# Patient Record
Sex: FEMALE | Race: BLACK OR AFRICAN AMERICAN | NOT HISPANIC OR LATINO | Employment: FULL TIME | URBAN - METROPOLITAN AREA
[De-identification: names, ages, dates, MRNs, and addresses within clinical notes are randomized per-mention and may not be internally consistent; named-entity substitution may affect disease eponyms.]

---

## 2018-07-11 ENCOUNTER — HOSPITAL ENCOUNTER (EMERGENCY)
Facility: OTHER | Age: 48
Discharge: HOME | End: 2018-07-11
Attending: EMERGENCY MEDICINE
Payer: MEDICAID

## 2018-07-11 VITALS
WEIGHT: 163 LBS | TEMPERATURE: 99 F | SYSTOLIC BLOOD PRESSURE: 119 MMHG | HEIGHT: 64 IN | DIASTOLIC BLOOD PRESSURE: 73 MMHG | OXYGEN SATURATION: 97 % | BODY MASS INDEX: 27.83 KG/M2 | HEART RATE: 88 BPM | RESPIRATION RATE: 18 BRPM

## 2018-07-11 DIAGNOSIS — T14.90XA TRAUMA: ICD-10-CM

## 2018-07-11 DIAGNOSIS — S83.91XA SPRAIN OF RIGHT KNEE, UNSPECIFIED LIGAMENT, INITIAL ENCOUNTER: Primary | ICD-10-CM

## 2018-07-11 PROCEDURE — 99283 EMERGENCY DEPT VISIT LOW MDM: CPT | Mod: 25

## 2018-07-11 PROCEDURE — 96372 THER/PROPH/DIAG INJ SC/IM: CPT

## 2018-07-11 PROCEDURE — 63600175 PHARM REV CODE 636 W HCPCS: Performed by: EMERGENCY MEDICINE

## 2018-07-11 RX ORDER — KETOROLAC TROMETHAMINE 30 MG/ML
15 INJECTION, SOLUTION INTRAMUSCULAR; INTRAVENOUS
Status: COMPLETED | OUTPATIENT
Start: 2018-07-11 | End: 2018-07-11

## 2018-07-11 RX ADMIN — KETOROLAC TROMETHAMINE 15 MG: 30 INJECTION, SOLUTION INTRAMUSCULAR at 03:07

## 2018-07-11 NOTE — ED PROVIDER NOTES
Encounter Date: 7/11/2018    SCRIBE #1 NOTE: Lee ARAUJO, am scribing for, and in the presence of, Dr. Hancock.       History     Chief Complaint   Patient presents with    Fall     trip and fall resulting in R knee pain immediately after. denies hitting head or LOC     Seen by provider: 3:29 PM    Patient is a 47 y.o. female who presents to the ED with complaint of right knee pain s/p fall which occurred this morning. Patient states she fell onto her right knee while getting out of bed this morning. She denies head trauma or LOC. Right knee pain is worse with movement and weight bearing. She reports difficulty walking secondary to pain, but has been able to ambulate and bear weight since the injury. She notes a small abrasion to the right knee. She denies swelling. She reports no other injuries. She has no additional complaints.      The history is provided by the patient.     Review of patient's allergies indicates:  No Known Allergies  Past Medical History:   Diagnosis Date    Lupus      Past Surgical History:   Procedure Laterality Date    HYSTERECTOMY       No family history on file.  Social History   Substance Use Topics    Smoking status: Never Smoker    Smokeless tobacco: Not on file    Alcohol use No     Review of Systems   Constitutional: Negative for fever.   HENT: Negative for sore throat.    Respiratory: Negative for shortness of breath.    Cardiovascular: Negative for chest pain.   Gastrointestinal: Negative for nausea.   Genitourinary: Negative for dysuria.   Musculoskeletal: Positive for gait problem (secondary to knee pain). Negative for back pain.        Positive for right knee pain.   Skin: Positive for wound (abrasions to right knee). Negative for rash.   Neurological: Negative for syncope, weakness and headaches.   Hematological: Does not bruise/bleed easily.   Psychiatric/Behavioral: Negative for confusion.       Physical Exam     Initial Vitals [07/11/18 1445]   BP Pulse Resp Temp  SpO2   (!) 139/96 97 18 97.6 °F (36.4 °C) 98 %      MAP       --         Physical Exam    Nursing note and vitals reviewed.  Constitutional: She appears well-developed and well-nourished. No distress.   HENT:   Head: Normocephalic and atraumatic.   Eyes: Conjunctivae and EOM are normal. Pupils are equal, round, and reactive to light.   Neck: Normal range of motion. Neck supple.   Cardiovascular: Intact distal pulses.   Pulmonary/Chest: No respiratory distress.   Musculoskeletal: She exhibits tenderness. She exhibits no edema.   Right knee with bony tenderness and abrasions. No swelling. Painful range of motion of the knee. No bony tenderness of the tibia, fibula, or ankle.    Neurological: She is alert and oriented to person, place, and time.   Skin: Skin is warm and dry.   Psychiatric: She has a normal mood and affect. Her behavior is normal. Judgment and thought content normal.         ED Course   Procedures  Labs Reviewed - No data to display       Imaging Results    None          Medical Decision Making:   Clinical Tests:   Radiological Study: Ordered and Reviewed  ED Management:  Well-appearing patient presents after a trip and fall.  Complains of right knee and lower leg pain. Only signified physical examination are present bony tenderness. No swelling no abrasions no ecchymosis. X-rays are negative for fracture.  Likely soft tissue sprain.  Ketorolac here for pain. Counseled on rice therapy.  Provided with work note.    I did have an extensive talk regarding signs to return for and need for follow up. Patient expressed understanding and will monitor symptoms closely and follow-up as needed.    KIEL Hancock M.D.  07/11/2018  4:06 PM              Scribe Attestation:   Scribe #1: I performed the above scribed service and the documentation accurately describes the services I performed. I attest to the accuracy of the note.    Attending Attestation:           Physician Attestation for Scribe:  Physician  Attestation Statement for Scribe #1: I, Dr. Hancock, reviewed documentation, as scribed by Lee Newton in my presence, and it is both accurate and complete.                    Clinical Impression:     1. Sprain of right knee, unspecified ligament, initial encounter    2. Trauma                                Norman Hancock MD  07/11/18 3815

## 2021-12-15 ENCOUNTER — OFFICE VISIT (OUTPATIENT)
Dept: URGENT CARE | Facility: CLINIC | Age: 51
End: 2021-12-15
Payer: OTHER MISCELLANEOUS

## 2021-12-15 VITALS
TEMPERATURE: 99 F | SYSTOLIC BLOOD PRESSURE: 95 MMHG | BODY MASS INDEX: 27.83 KG/M2 | RESPIRATION RATE: 20 BRPM | WEIGHT: 163 LBS | HEART RATE: 80 BPM | HEIGHT: 64 IN | OXYGEN SATURATION: 96 % | DIASTOLIC BLOOD PRESSURE: 66 MMHG

## 2021-12-15 DIAGNOSIS — S56.912A STRAIN OF LEFT FOREARM, INITIAL ENCOUNTER: ICD-10-CM

## 2021-12-15 DIAGNOSIS — Z02.83 ENCOUNTER FOR DRUG SCREENING: Primary | ICD-10-CM

## 2021-12-15 DIAGNOSIS — M25.522 LEFT ELBOW PAIN: ICD-10-CM

## 2021-12-15 DIAGNOSIS — M79.632 LEFT FOREARM PAIN: ICD-10-CM
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
## 2021-12-15 PROCEDURE — 80305 OOH NON-DOT DRUG SCREEN: ICD-10-PCS | Mod: S$GLB,,, | Performed by: PHYSICIAN ASSISTANT

## 2021-12-15 PROCEDURE — 73080 XR ELBOW COMPLETE 3 VIEW LEFT: ICD-10-PCS | Mod: LT,S$GLB,, | Performed by: RADIOLOGY

## 2021-12-15 PROCEDURE — 80305 DRUG TEST PRSMV DIR OPT OBS: CPT | Mod: S$GLB,,, | Performed by: PHYSICIAN ASSISTANT

## 2021-12-15 PROCEDURE — 73090 XR FOREARM LEFT: ICD-10-PCS | Mod: LT,S$GLB,, | Performed by: RADIOLOGY

## 2021-12-15 PROCEDURE — 82075 POCT BREATH ALCOHOL TEST: ICD-10-PCS | Mod: S$GLB,,, | Performed by: PHYSICIAN ASSISTANT

## 2021-12-15 PROCEDURE — 73090 X-RAY EXAM OF FOREARM: CPT | Mod: LT,S$GLB,, | Performed by: RADIOLOGY

## 2021-12-15 PROCEDURE — 73080 X-RAY EXAM OF ELBOW: CPT | Mod: LT,S$GLB,, | Performed by: RADIOLOGY

## 2021-12-15 PROCEDURE — 82075 ASSAY OF BREATH ETHANOL: CPT | Mod: S$GLB,,, | Performed by: PHYSICIAN ASSISTANT

## 2021-12-15 PROCEDURE — 99203 OFFICE O/P NEW LOW 30 MIN: CPT | Mod: S$GLB,,, | Performed by: PHYSICIAN ASSISTANT

## 2021-12-15 PROCEDURE — 99203 PR OFFICE/OUTPT VISIT, NEW, LEVL III, 30-44 MIN: ICD-10-PCS | Mod: S$GLB,,, | Performed by: PHYSICIAN ASSISTANT

## 2021-12-15 RX ORDER — NAPROXEN 500 MG/1
500 TABLET ORAL 2 TIMES DAILY WITH MEALS
Qty: 20 TABLET | Refills: 0 | Status: SHIPPED | OUTPATIENT
Start: 2021-12-15 | End: 2021-12-25

## 2021-12-16 LAB — BREATH ALCOHOL: 0

## 2021-12-20 ENCOUNTER — OFFICE VISIT (OUTPATIENT)
Dept: URGENT CARE | Facility: CLINIC | Age: 51
End: 2021-12-20
Payer: OTHER MISCELLANEOUS

## 2021-12-20 VITALS
OXYGEN SATURATION: 99 % | WEIGHT: 163 LBS | DIASTOLIC BLOOD PRESSURE: 82 MMHG | HEART RATE: 82 BPM | HEIGHT: 64 IN | BODY MASS INDEX: 27.83 KG/M2 | SYSTOLIC BLOOD PRESSURE: 114 MMHG | TEMPERATURE: 98 F | RESPIRATION RATE: 16 BRPM

## 2021-12-20 DIAGNOSIS — M25.522 LEFT ELBOW PAIN: ICD-10-CM

## 2021-12-20 DIAGNOSIS — M79.632 LEFT FOREARM PAIN: Primary | ICD-10-CM

## 2021-12-20 PROCEDURE — 99203 OFFICE O/P NEW LOW 30 MIN: CPT | Mod: S$GLB,,, | Performed by: NURSE PRACTITIONER

## 2021-12-20 PROCEDURE — 99203 PR OFFICE/OUTPT VISIT, NEW, LEVL III, 30-44 MIN: ICD-10-PCS | Mod: S$GLB,,, | Performed by: NURSE PRACTITIONER

## 2022-01-06 ENCOUNTER — LAB VISIT (OUTPATIENT)
Dept: PRIMARY CARE CLINIC | Facility: CLINIC | Age: 52
End: 2022-01-06
Payer: MEDICAID

## 2022-01-06 DIAGNOSIS — Z20.822 CONTACT WITH AND (SUSPECTED) EXPOSURE TO COVID-19: ICD-10-CM

## 2022-01-06 LAB
CTP QC/QA: YES
SARS-COV-2 AG RESP QL IA.RAPID: NEGATIVE

## 2022-01-06 PROCEDURE — 87811 SARS-COV-2 COVID19 W/OPTIC: CPT

## 2023-10-26 ENCOUNTER — HOSPITAL ENCOUNTER (OUTPATIENT)
Facility: OTHER | Age: 53
Discharge: HOME | End: 2023-10-28
Attending: EMERGENCY MEDICINE | Admitting: INTERNAL MEDICINE
Payer: COMMERCIAL

## 2023-10-26 DIAGNOSIS — R50.9 FEVER: ICD-10-CM

## 2023-10-26 DIAGNOSIS — N17.9 AKI (ACUTE KIDNEY INJURY): Primary | ICD-10-CM

## 2023-10-26 DIAGNOSIS — R00.0 TACHYCARDIA: ICD-10-CM

## 2023-10-26 LAB
ALBUMIN SERPL BCP-MCNC: 3 G/DL (ref 3.5–5.2)
ALBUMIN SERPL BCP-MCNC: 3.4 G/DL (ref 3.5–5.2)
ALP SERPL-CCNC: 67 U/L (ref 55–135)
ALP SERPL-CCNC: 74 U/L (ref 55–135)
ALT SERPL W/O P-5'-P-CCNC: 11 U/L (ref 10–44)
ALT SERPL W/O P-5'-P-CCNC: 13 U/L (ref 10–44)
ANION GAP SERPL CALC-SCNC: 11 MMOL/L (ref 8–16)
ANION GAP SERPL CALC-SCNC: 13 MMOL/L (ref 8–16)
AST SERPL-CCNC: 18 U/L (ref 10–40)
AST SERPL-CCNC: 19 U/L (ref 10–40)
BASOPHILS # BLD AUTO: 0.02 K/UL (ref 0–0.2)
BASOPHILS NFR BLD: 0.2 % (ref 0–1.9)
BILIRUB SERPL-MCNC: 0.3 MG/DL (ref 0.1–1)
BILIRUB SERPL-MCNC: 0.4 MG/DL (ref 0.1–1)
BILIRUB UR QL STRIP: NEGATIVE
BUN SERPL-MCNC: 19 MG/DL (ref 6–20)
BUN SERPL-MCNC: 22 MG/DL (ref 6–20)
CALCIUM SERPL-MCNC: 7.9 MG/DL (ref 8.7–10.5)
CALCIUM SERPL-MCNC: 8.1 MG/DL (ref 8.7–10.5)
CHLORIDE SERPL-SCNC: 109 MMOL/L (ref 95–110)
CHLORIDE SERPL-SCNC: 112 MMOL/L (ref 95–110)
CK SERPL-CCNC: 131 U/L (ref 20–180)
CLARITY UR: CLEAR
CO2 SERPL-SCNC: 17 MMOL/L (ref 23–29)
CO2 SERPL-SCNC: 17 MMOL/L (ref 23–29)
COLOR UR: YELLOW
CREAT SERPL-MCNC: 1.6 MG/DL (ref 0.5–1.4)
CREAT SERPL-MCNC: 2.2 MG/DL (ref 0.5–1.4)
CTP QC/QA: YES
CTP QC/QA: YES
DIFFERENTIAL METHOD: ABNORMAL
EOSINOPHIL # BLD AUTO: 0.1 K/UL (ref 0–0.5)
EOSINOPHIL NFR BLD: 0.9 % (ref 0–8)
ERYTHROCYTE [DISTWIDTH] IN BLOOD BY AUTOMATED COUNT: 13 % (ref 11.5–14.5)
EST. GFR  (NO RACE VARIABLE): 26 ML/MIN/1.73 M^2
EST. GFR  (NO RACE VARIABLE): 38 ML/MIN/1.73 M^2
GLUCOSE SERPL-MCNC: 111 MG/DL (ref 70–110)
GLUCOSE SERPL-MCNC: 133 MG/DL (ref 70–110)
GLUCOSE UR QL STRIP: NEGATIVE
HCT VFR BLD AUTO: 38 % (ref 37–48.5)
HGB BLD-MCNC: 11.8 G/DL (ref 12–16)
HGB UR QL STRIP: ABNORMAL
IMM GRANULOCYTES # BLD AUTO: 0.02 K/UL (ref 0–0.04)
IMM GRANULOCYTES NFR BLD AUTO: 0.2 % (ref 0–0.5)
KETONES UR QL STRIP: NEGATIVE
LEUKOCYTE ESTERASE UR QL STRIP: NEGATIVE
LYMPHOCYTES # BLD AUTO: 1.4 K/UL (ref 1–4.8)
LYMPHOCYTES NFR BLD: 12.8 % (ref 18–48)
MCH RBC QN AUTO: 26.1 PG (ref 27–31)
MCHC RBC AUTO-ENTMCNC: 31.1 G/DL (ref 32–36)
MCV RBC AUTO: 84 FL (ref 82–98)
MONOCYTES # BLD AUTO: 0.2 K/UL (ref 0.3–1)
MONOCYTES NFR BLD: 1.7 % (ref 4–15)
NEUTROPHILS # BLD AUTO: 9 K/UL (ref 1.8–7.7)
NEUTROPHILS NFR BLD: 84.2 % (ref 38–73)
NITRITE UR QL STRIP: NEGATIVE
NRBC BLD-RTO: 0 /100 WBC
PH UR STRIP: 7 [PH] (ref 5–8)
PLATELET # BLD AUTO: 163 K/UL (ref 150–450)
PLATELET BLD QL SMEAR: ABNORMAL
PMV BLD AUTO: 9.3 FL (ref 9.2–12.9)
POC MOLECULAR INFLUENZA A AGN: NEGATIVE
POC MOLECULAR INFLUENZA B AGN: NEGATIVE
POTASSIUM SERPL-SCNC: 3.8 MMOL/L (ref 3.5–5.1)
POTASSIUM SERPL-SCNC: 4.1 MMOL/L (ref 3.5–5.1)
PROCALCITONIN SERPL IA-MCNC: 0.27 NG/ML
PROT SERPL-MCNC: 7.4 G/DL (ref 6–8.4)
PROT SERPL-MCNC: 8.3 G/DL (ref 6–8.4)
PROT UR QL STRIP: ABNORMAL
RBC # BLD AUTO: 4.52 M/UL (ref 4–5.4)
SARS-COV-2 RDRP RESP QL NAA+PROBE: NEGATIVE
SODIUM SERPL-SCNC: 139 MMOL/L (ref 136–145)
SODIUM SERPL-SCNC: 140 MMOL/L (ref 136–145)
SP GR UR STRIP: 1.01 (ref 1–1.03)
URN SPEC COLLECT METH UR: ABNORMAL
UROBILINOGEN UR STRIP-ACNC: NEGATIVE EU/DL
WBC # BLD AUTO: 10.69 K/UL (ref 3.9–12.7)

## 2023-10-26 PROCEDURE — 63600175 PHARM REV CODE 636 W HCPCS: Performed by: NURSE PRACTITIONER

## 2023-10-26 PROCEDURE — 63600175 PHARM REV CODE 636 W HCPCS: Performed by: PHYSICIAN ASSISTANT

## 2023-10-26 PROCEDURE — G0378 HOSPITAL OBSERVATION PER HR: HCPCS

## 2023-10-26 PROCEDURE — 93010 EKG 12-LEAD: ICD-10-PCS | Mod: ,,, | Performed by: INTERNAL MEDICINE

## 2023-10-26 PROCEDURE — 25000003 PHARM REV CODE 250: Performed by: NURSE PRACTITIONER

## 2023-10-26 PROCEDURE — 96361 HYDRATE IV INFUSION ADD-ON: CPT

## 2023-10-26 PROCEDURE — 99285 EMERGENCY DEPT VISIT HI MDM: CPT | Mod: 25

## 2023-10-26 PROCEDURE — 36415 COLL VENOUS BLD VENIPUNCTURE: CPT | Performed by: EMERGENCY MEDICINE

## 2023-10-26 PROCEDURE — 82550 ASSAY OF CK (CPK): CPT | Performed by: PHYSICIAN ASSISTANT

## 2023-10-26 PROCEDURE — 80053 COMPREHEN METABOLIC PANEL: CPT | Performed by: PHYSICIAN ASSISTANT

## 2023-10-26 PROCEDURE — 81003 URINALYSIS AUTO W/O SCOPE: CPT | Performed by: NURSE PRACTITIONER

## 2023-10-26 PROCEDURE — 96360 HYDRATION IV INFUSION INIT: CPT

## 2023-10-26 PROCEDURE — 80053 COMPREHEN METABOLIC PANEL: CPT | Mod: 91 | Performed by: NURSE PRACTITIONER

## 2023-10-26 PROCEDURE — 85025 COMPLETE CBC W/AUTO DIFF WBC: CPT | Performed by: PHYSICIAN ASSISTANT

## 2023-10-26 PROCEDURE — 93005 ELECTROCARDIOGRAM TRACING: CPT

## 2023-10-26 PROCEDURE — 93010 ELECTROCARDIOGRAM REPORT: CPT | Mod: ,,, | Performed by: INTERNAL MEDICINE

## 2023-10-26 PROCEDURE — 84145 PROCALCITONIN (PCT): CPT | Performed by: NURSE PRACTITIONER

## 2023-10-26 PROCEDURE — 87635 SARS-COV-2 COVID-19 AMP PRB: CPT | Performed by: NURSE PRACTITIONER

## 2023-10-26 PROCEDURE — 94761 N-INVAS EAR/PLS OXIMETRY MLT: CPT

## 2023-10-26 RX ORDER — TALC
6 POWDER (GRAM) TOPICAL NIGHTLY PRN
Status: DISCONTINUED | OUTPATIENT
Start: 2023-10-26 | End: 2023-10-28 | Stop reason: HOSPADM

## 2023-10-26 RX ORDER — SODIUM CHLORIDE 0.9 % (FLUSH) 0.9 %
10 SYRINGE (ML) INJECTION
Status: DISCONTINUED | OUTPATIENT
Start: 2023-10-26 | End: 2023-10-28 | Stop reason: HOSPADM

## 2023-10-26 RX ORDER — SODIUM CHLORIDE, SODIUM LACTATE, POTASSIUM CHLORIDE, CALCIUM CHLORIDE 600; 310; 30; 20 MG/100ML; MG/100ML; MG/100ML; MG/100ML
INJECTION, SOLUTION INTRAVENOUS CONTINUOUS
Status: DISCONTINUED | OUTPATIENT
Start: 2023-10-26 | End: 2023-10-28

## 2023-10-26 RX ORDER — MORPHINE SULFATE 4 MG/ML
4 INJECTION, SOLUTION INTRAMUSCULAR; INTRAVENOUS EVERY 6 HOURS PRN
Status: DISCONTINUED | OUTPATIENT
Start: 2023-10-26 | End: 2023-10-27

## 2023-10-26 RX ORDER — HYDROXYCHLOROQUINE SULFATE 200 MG/1
400 TABLET, FILM COATED ORAL DAILY
COMMUNITY
Start: 2023-09-24

## 2023-10-26 RX ORDER — ACETAMINOPHEN 500 MG
1000 TABLET ORAL
Status: COMPLETED | OUTPATIENT
Start: 2023-10-26 | End: 2023-10-26

## 2023-10-26 RX ORDER — HYDROXYCHLOROQUINE SULFATE 200 MG/1
200 TABLET, FILM COATED ORAL 2 TIMES DAILY
Status: DISCONTINUED | OUTPATIENT
Start: 2023-10-27 | End: 2023-10-28 | Stop reason: HOSPADM

## 2023-10-26 RX ORDER — AMOXICILLIN 250 MG
1 CAPSULE ORAL 2 TIMES DAILY PRN
Status: DISCONTINUED | OUTPATIENT
Start: 2023-10-27 | End: 2023-10-28 | Stop reason: HOSPADM

## 2023-10-26 RX ORDER — NALOXONE HCL 0.4 MG/ML
0.02 VIAL (ML) INJECTION
Status: DISCONTINUED | OUTPATIENT
Start: 2023-10-27 | End: 2023-10-28 | Stop reason: HOSPADM

## 2023-10-26 RX ORDER — SODIUM CHLORIDE 0.9 % (FLUSH) 0.9 %
10 SYRINGE (ML) INJECTION EVERY 8 HOURS
Status: DISCONTINUED | OUTPATIENT
Start: 2023-10-27 | End: 2023-10-28 | Stop reason: HOSPADM

## 2023-10-26 RX ORDER — HEPARIN SODIUM 5000 [USP'U]/ML
5000 INJECTION, SOLUTION INTRAVENOUS; SUBCUTANEOUS EVERY 8 HOURS
Status: DISCONTINUED | OUTPATIENT
Start: 2023-10-27 | End: 2023-10-28 | Stop reason: HOSPADM

## 2023-10-26 RX ORDER — ACETAMINOPHEN 325 MG/1
650 TABLET ORAL EVERY 6 HOURS PRN
Status: DISCONTINUED | OUTPATIENT
Start: 2023-10-27 | End: 2023-10-28 | Stop reason: HOSPADM

## 2023-10-26 RX ORDER — KETOROLAC TROMETHAMINE 30 MG/ML
15 INJECTION, SOLUTION INTRAMUSCULAR; INTRAVENOUS
Status: DISCONTINUED | OUTPATIENT
Start: 2023-10-26 | End: 2023-10-26

## 2023-10-26 RX ADMIN — SODIUM CHLORIDE, POTASSIUM CHLORIDE, SODIUM LACTATE AND CALCIUM CHLORIDE 1000 ML: 600; 310; 30; 20 INJECTION, SOLUTION INTRAVENOUS at 01:10

## 2023-10-26 RX ADMIN — ACETAMINOPHEN 1000 MG: 500 TABLET ORAL at 06:10

## 2023-10-26 RX ADMIN — SODIUM CHLORIDE, POTASSIUM CHLORIDE, SODIUM LACTATE AND CALCIUM CHLORIDE: 600; 310; 30; 20 INJECTION, SOLUTION INTRAVENOUS at 10:10

## 2023-10-26 RX ADMIN — SODIUM CHLORIDE, POTASSIUM CHLORIDE, SODIUM LACTATE AND CALCIUM CHLORIDE 1000 ML: 600; 310; 30; 20 INJECTION, SOLUTION INTRAVENOUS at 03:10

## 2023-10-26 NOTE — ED TRIAGE NOTES
Patient states she received an infusion for the first time at [facility] last Friday for osteoporosis. States since infusion, she has had generalized body aches and fever. Taking Tylenol and ibuprofen without relief. Presents in no distress.

## 2023-10-26 NOTE — ED NOTES
Patient reports SOB with ambulation.  Noted that patient is tachycardic s/t fever.  Notified provider of findings.

## 2023-10-26 NOTE — Clinical Note
"Lyubov Lowe" Eddie was seen and treated in our emergency department on 10/26/2023.  She may return to work on 11/06/2023.       If you have any questions or concerns, please don't hesitate to call.      Jillian CUENCA"

## 2023-10-26 NOTE — ED NOTES
Patient identifiers verified and correct for patient  C/C: generalized body aches  APPEARANCE: awake and alert in mild distress s/t generalized fatigue and body aches to bl hands and arms  SKIN: hot, dry and intact. No breakdown or bruising.   MUSCULOSKELETAL: Patient moving all extremities spontaneously, difficulty in movement of bl fingers, reports swelling and pain to hands. Ambulates independently PTA but now having difficutly d/t generalized pain to joint  RESPIRATORY: Denies shortness of breath.Respirations unlabored. Clear to auscultation  CARDIAC: Denies CP,  distal pulses; no peripheral edema  ABDOMEN: denies abdominal pain and n/v/d   : voids spontaneously, denies difficulty  Neurologic: AAO x 4; follows commands equal strength in all extremities; denies numbness/tingling. Denies dizziness

## 2023-10-26 NOTE — Clinical Note
"Lyubov Lowe" Eddie was seen and treated in our emergency department on 10/26/2023.  She may return to work on 10/30/2023.       If you have any questions or concerns, please don't hesitate to call.      Jillian CUENCA"

## 2023-10-26 NOTE — FIRST PROVIDER EVALUATION
Emergency Department TeleTriage Encounter Note      CHIEF COMPLAINT    Chief Complaint   Patient presents with    Generalized Body Aches     Body aches and pam leg and hand pain since fusion to right forearm Friday at [facility].        VITAL SIGNS   Initial Vitals [10/26/23 1134]   BP Pulse Resp Temp SpO2   (!) 94/59 110 20 98.2 °F (36.8 °C) 98 %      MAP       --            ALLERGIES    Review of patient's allergies indicates:  No Known Allergies    PROVIDER TRIAGE NOTE  This is a teletriage evaluation of a 52 y.o. female presenting to the ED complaining of body aches. Patient reports generalized body aches since having forearm fusion 6 days ago at [facility]. Denies fever or swelling.    Patient is alert and oriented. She speaks in complete sentences. She is sitting upright in the chair in no distress.     Initial orders will be placed and care will be transferred to an alternate provider when patient is roomed for a full evaluation. Any additional orders and the final disposition will be determined by that provider.         ORDERS  Labs Reviewed   CBC W/ AUTO DIFFERENTIAL   COMPREHENSIVE METABOLIC PANEL       ED Orders (720h ago, onward)      Start Ordered     Status Ordering Provider    10/26/23 1142 10/26/23 1141  CBC auto differential  STAT         Ordered GARY JUSTICE.    10/26/23 1142 10/26/23 1141  Comprehensive metabolic panel  STAT         Ordered GARY JUSTICE    10/26/23 1142 10/26/23 1141  Insert Saline lock IV  Once         Ordered GARY JUSTICE    10/26/23 1142 10/26/23 1141  EKG 12-lead  Once         Ordered GARY JUSTICE              Virtual Visit Note: The provider triage portion of this emergency department evaluation and documentation was performed via [employer], a HIPAA-compliant telemedicine application, in concert with a tele-presenter in the room. A face to face patient evaluation with one of my colleagues will occur once the patient is placed in an emergency department  room.      DISCLAIMER: This note was prepared with [employer] voice recognition transcription software. Garbled syntax, mangled pronouns, and other bizarre constructions may be attributed to that software system.

## 2023-10-26 NOTE — Clinical Note
"Lyubov"Ervin Morgan was seen and treated in our emergency department on 10/26/2023.  She may return to work on 11/06/2023.  Norman Lucio MD     If you have any questions or concerns, please don't hesitate to call.      Jillian CUENCA"

## 2023-10-26 NOTE — ED PROVIDER NOTES
"Source of History:  Patient     Chief complaint:  Generalized Body Aches (Body aches and pam leg and hand pain since fusion to right forearm Friday at [facility]. )      HPI:  Lyubov Morgan is a 52 y.o. female presenting to the emergency department reporting body aches, joint and bone pain with fatigue and malaise that began 3 days ago.  Patient received a infusion of Reclast on the 20th.  She denies any fever/chills cough, congestion or any abdominal pain.  No nausea vomiting or diarrhea    This is the extent to the patients complaints today here in the emergency department.    PMH:  As per HPI and below:  Past Medical History:   Diagnosis Date    Lupus      Past Surgical History:   Procedure Laterality Date    HYSTERECTOMY         Social History     Tobacco Use    Smoking status: Never    Smokeless tobacco: Never   Substance Use Topics    Alcohol use: No    Drug use: No       Review of patient's allergies indicates:  No Known Allergies    ROS: As per HPI and below:  General: No  fever.  No chills.  Fatigue/malaise, body aches  ENT: No sore throat. No ear pain  Chest: No shortness of breath. No cough.    Cardiovascular: No chest pain.   Abdomen:  No abdominal pain  Genito-Urinary: No abnormal urination.    Neurologic: No focal weakness.  No numbness.  No headache  MSK: no back pain.   Integument: No rashes or lesions.    Physical Exam:    /66 (BP Location: Right arm, Patient Position: Lying)   Pulse (!) 113   Temp 100 °F (37.8 °C) (Oral)   Resp 19   Ht 5' 4" (1.626 m)   Wt 78 kg (172 lb)   SpO2 96%   BMI 29.52 kg/m²   Vitals:    10/26/23 1308 10/26/23 1438 10/26/23 1556 10/26/23 1750   BP: (!) 98/58 116/66 112/73 109/67   Pulse: 98 105 (!) 120 (!) 122   Resp: 18 18 20 18   Temp:  97.8 °F (36.6 °C) 99.5 °F (37.5 °C) (!) 101.9 °F (38.8 °C)   TempSrc:  Oral Oral Oral   SpO2: 95% 98% 100% 99%   Weight:       Height:        10/26/23 2018   BP: 110/66   Pulse: (!) 113   Resp: 19   Temp: 100 °F (37.8 °C) "   TempSrc: Oral   SpO2: 96%   Weight:    Height:        Nursing note and vital signs reviewed.  Appearance: No acute distress. Well-appearing. Non-toxic.    Eyes: No conjunctival injection.  Extraocular muscles are intact.  ENT:  Mucous membranes are pink and moist.  Chest/ Respiratory: Clear to auscultation bilaterally.  Good air movement.  No wheezes.  No rhonchi. No rales. No accessory muscle use.  Cardiovascular: Regular rate and rhythm.  No murmurs. No gallops. No rubs.  Abdomen: Soft.  Nontender to palpation, bowel sounds are normal.  Back:  No CVA tenderness  Musculoskeletal: Good range of motion all joints.  No deformities.  Neck supple.  No meningismus.  Swelling noted to bilateral MCP joints  Skin: No rashes seen.  Good turgor.  No abrasions.  No ecchymoses.  Neuro: alert and oriented x3,  no focal neurological deficits.  Psych: Appropriate, conversant    Initial MDM:  52-year-old female who received a infusion of Reclast 6 days ago, presented with malaise, fatigue, body aches that began 3 days ago.  Per up-to-date, ELIDIA and acute phase reaction or common side effects.  Will obtain blood work to further evaluate.    Labs Reviewed   CBC W/ AUTO DIFFERENTIAL - Abnormal; Notable for the following components:       Result Value    Hemoglobin 11.8 (*)     MCH 26.1 (*)     MCHC 31.1 (*)     Gran # (ANC) 9.0 (*)     Mono # 0.2 (*)     Gran % 84.2 (*)     Lymph % 12.8 (*)     Mono % 1.7 (*)     All other components within normal limits   COMPREHENSIVE METABOLIC PANEL - Abnormal; Notable for the following components:    CO2 17 (*)     Glucose 111 (*)     BUN 22 (*)     Creatinine 2.2 (*)     Calcium 8.1 (*)     Albumin 3.4 (*)     eGFR 26 (*)     All other components within normal limits   COMPREHENSIVE METABOLIC PANEL - Abnormal; Notable for the following components:    Chloride 112 (*)     CO2 17 (*)     Glucose 133 (*)     Creatinine 1.6 (*)     Calcium 7.9 (*)     Albumin 3.0 (*)     eGFR 38 (*)     All other  components within normal limits   URINALYSIS, REFLEX TO URINE CULTURE - Abnormal; Notable for the following components:    Protein, UA Trace (*)     Occult Blood UA Trace (*)     All other components within normal limits    Narrative:     Specimen Source->Urine   CK   PROCALCITONIN   POCT INFLUENZA A/B MOLECULAR   SARS-COV-2 RDRP GENE       X-Ray Chest 1 View   Final Result      1. Pulmonary findings may reflect edema noting shallow inspiratory effort.  No large focal consolidation.         Electronically signed by: Matt Morris MD   Date:    10/26/2023   Time:    18:31            Initial Impression/ Differential Dx:  Differential diagnosis includes but not limited to: GERD, intestinal spasm, gastroenteritis, gastritis, ulcer, cholecystitis, cholelithiasis, gallstones, pancreatitis, ileus, small bowel obstruction, appendicitis, diverticulitis, colitis, constipation, intestinal gas pain      MDM:    52 y.o. female with osteoporosis, lupus who received a infusion of Reclast 6 days ago presented for malaise, bone pain and body aches x3 days.  Labs revealed an ELIDIA with a creatinine of 2.2, along with a CO2 of 17 without a gap.  ELIDIA as a side effect of Reclast infusions.  Spoke with Nephrology who recommended IV fluids.  Patient was moved up to the EDOU for fluids and a CMP recheck.  While in the ED you she spiked a fever of 101.9, chest x-ray was obtained along with a UA which were both negative for any infectious processes.  Unknown fever source.  Patient's repeat CMP had an improved creatinine however not back to normal.  She will be admitted to Hospital Medicine for continued treatment of her ELIDIA and unknown fever source.    ED Course as of 10/26/23 2135   Thu Oct 26, 2023   1229 WBC: 10.69 [AS]   1229 Hemoglobin(!): 11.8 [AS]   1229 Hematocrit: 38.0 [AS]   1240 CO2(!): 17 [AS]   1240 BUN(!): 22 [AS]   1240 Creatinine(!): 2.2 [AS]   1404 Spoke with Momo Kolb with Nephro about patients labs.  He recommended  2-3 L of LR and recheck BMP to reassess creat.  Can be discharged if creat improves.   [AS]   2023 Creatinine(!): 1.6 [AS]   2023 BUN: 19 [AS]   2023 Calcium(!): 7.9 [AS]   2023 CO2(!): 17 [AS]      ED Course User Index  [AS] Polly Dorsey FNP       Diagnostic Impression:    1. ELIDIA (acute kidney injury)    2. Tachycardia    3. Fever         ED Disposition Condition    Observation Stable                    Polly Dorsey FNP  10/26/23 2135

## 2023-10-27 PROBLEM — M79.10 MYALGIA: Status: ACTIVE | Noted: 2023-10-27

## 2023-10-27 PROBLEM — M81.0 OSTEOPOROSIS: Status: ACTIVE | Noted: 2023-10-27

## 2023-10-27 PROBLEM — R50.9 FEVER: Status: ACTIVE | Noted: 2023-10-27

## 2023-10-27 LAB
ADENOVIRUS: NOT DETECTED
ANION GAP SERPL CALC-SCNC: 7 MMOL/L (ref 8–16)
BASOPHILS # BLD AUTO: 0.01 K/UL (ref 0–0.2)
BASOPHILS NFR BLD: 0.1 % (ref 0–1.9)
BORDETELLA PARAPERTUSSIS (IS1001): NOT DETECTED
BORDETELLA PERTUSSIS (PTXP): NOT DETECTED
BUN SERPL-MCNC: 16 MG/DL (ref 6–20)
C3 SERPL-MCNC: 104 MG/DL (ref 50–180)
C4 SERPL-MCNC: 23 MG/DL (ref 11–44)
CALCIUM SERPL-MCNC: 7.9 MG/DL (ref 8.7–10.5)
CHLAMYDIA PNEUMONIAE: NOT DETECTED
CHLORIDE SERPL-SCNC: 114 MMOL/L (ref 95–110)
CO2 SERPL-SCNC: 19 MMOL/L (ref 23–29)
CORONAVIRUS 229E, COMMON COLD VIRUS: NOT DETECTED
CORONAVIRUS HKU1, COMMON COLD VIRUS: NOT DETECTED
CORONAVIRUS NL63, COMMON COLD VIRUS: NOT DETECTED
CORONAVIRUS OC43, COMMON COLD VIRUS: NOT DETECTED
CREAT SERPL-MCNC: 1.1 MG/DL (ref 0.5–1.4)
DIFFERENTIAL METHOD: ABNORMAL
EOSINOPHIL # BLD AUTO: 0.1 K/UL (ref 0–0.5)
EOSINOPHIL NFR BLD: 1.7 % (ref 0–8)
ERYTHROCYTE [DISTWIDTH] IN BLOOD BY AUTOMATED COUNT: 13.3 % (ref 11.5–14.5)
EST. GFR  (NO RACE VARIABLE): >60 ML/MIN/1.73 M^2
FLUBV RNA NPH QL NAA+NON-PROBE: NOT DETECTED
GLUCOSE SERPL-MCNC: 121 MG/DL (ref 70–110)
HCT VFR BLD AUTO: 32 % (ref 37–48.5)
HGB BLD-MCNC: 10.4 G/DL (ref 12–16)
HPIV1 RNA NPH QL NAA+NON-PROBE: NOT DETECTED
HPIV2 RNA NPH QL NAA+NON-PROBE: NOT DETECTED
HPIV3 RNA NPH QL NAA+NON-PROBE: NOT DETECTED
HPIV4 RNA NPH QL NAA+NON-PROBE: NOT DETECTED
HUMAN METAPNEUMOVIRUS: NOT DETECTED
IMM GRANULOCYTES # BLD AUTO: 0.02 K/UL (ref 0–0.04)
IMM GRANULOCYTES NFR BLD AUTO: 0.2 % (ref 0–0.5)
INFLUENZA A (SUBTYPES H1,H1-2009,H3): NOT DETECTED
LYMPHOCYTES # BLD AUTO: 1 K/UL (ref 1–4.8)
LYMPHOCYTES NFR BLD: 12.2 % (ref 18–48)
MAGNESIUM SERPL-MCNC: 1.7 MG/DL (ref 1.6–2.6)
MCH RBC QN AUTO: 26.4 PG (ref 27–31)
MCHC RBC AUTO-ENTMCNC: 32.5 G/DL (ref 32–36)
MCV RBC AUTO: 81 FL (ref 82–98)
MONOCYTES # BLD AUTO: 0.2 K/UL (ref 0.3–1)
MONOCYTES NFR BLD: 1.8 % (ref 4–15)
MYCOPLASMA PNEUMONIAE: NOT DETECTED
NEUTROPHILS # BLD AUTO: 7 K/UL (ref 1.8–7.7)
NEUTROPHILS NFR BLD: 84 % (ref 38–73)
NRBC BLD-RTO: 0 /100 WBC
PLATELET # BLD AUTO: 160 K/UL (ref 150–450)
PMV BLD AUTO: 9.2 FL (ref 9.2–12.9)
POTASSIUM SERPL-SCNC: 3.6 MMOL/L (ref 3.5–5.1)
RBC # BLD AUTO: 3.94 M/UL (ref 4–5.4)
RESPIRATORY INFECTION PANEL SOURCE: NORMAL
RSV RNA NPH QL NAA+NON-PROBE: NOT DETECTED
RV+EV RNA NPH QL NAA+NON-PROBE: NOT DETECTED
SARS-COV-2 RNA RESP QL NAA+PROBE: NOT DETECTED
SODIUM SERPL-SCNC: 140 MMOL/L (ref 136–145)
WBC # BLD AUTO: 8.34 K/UL (ref 3.9–12.7)

## 2023-10-27 PROCEDURE — 87633 RESP VIRUS 12-25 TARGETS: CPT | Performed by: INTERNAL MEDICINE

## 2023-10-27 PROCEDURE — 63600175 PHARM REV CODE 636 W HCPCS: Performed by: PHYSICIAN ASSISTANT

## 2023-10-27 PROCEDURE — 36415 COLL VENOUS BLD VENIPUNCTURE: CPT | Performed by: PHYSICIAN ASSISTANT

## 2023-10-27 PROCEDURE — G0378 HOSPITAL OBSERVATION PER HR: HCPCS

## 2023-10-27 PROCEDURE — 80048 BASIC METABOLIC PNL TOTAL CA: CPT | Performed by: PHYSICIAN ASSISTANT

## 2023-10-27 PROCEDURE — 86160 COMPLEMENT ANTIGEN: CPT | Performed by: PHYSICIAN ASSISTANT

## 2023-10-27 PROCEDURE — 87798 DETECT AGENT NOS DNA AMP: CPT | Mod: 59 | Performed by: INTERNAL MEDICINE

## 2023-10-27 PROCEDURE — 25000003 PHARM REV CODE 250: Performed by: PHYSICIAN ASSISTANT

## 2023-10-27 PROCEDURE — 25000003 PHARM REV CODE 250: Performed by: INTERNAL MEDICINE

## 2023-10-27 PROCEDURE — 85025 COMPLETE CBC W/AUTO DIFF WBC: CPT | Performed by: PHYSICIAN ASSISTANT

## 2023-10-27 PROCEDURE — 96372 THER/PROPH/DIAG INJ SC/IM: CPT | Performed by: PHYSICIAN ASSISTANT

## 2023-10-27 PROCEDURE — A4216 STERILE WATER/SALINE, 10 ML: HCPCS | Performed by: PHYSICIAN ASSISTANT

## 2023-10-27 PROCEDURE — 83735 ASSAY OF MAGNESIUM: CPT | Performed by: PHYSICIAN ASSISTANT

## 2023-10-27 PROCEDURE — 86160 COMPLEMENT ANTIGEN: CPT | Mod: 59 | Performed by: PHYSICIAN ASSISTANT

## 2023-10-27 PROCEDURE — 96361 HYDRATE IV INFUSION ADD-ON: CPT

## 2023-10-27 RX ORDER — POTASSIUM CHLORIDE 20 MEQ/1
40 TABLET, EXTENDED RELEASE ORAL ONCE
Status: COMPLETED | OUTPATIENT
Start: 2023-10-27 | End: 2023-10-27

## 2023-10-27 RX ORDER — HYDROCODONE BITARTRATE AND ACETAMINOPHEN 5; 325 MG/1; MG/1
1 TABLET ORAL EVERY 6 HOURS PRN
Status: DISCONTINUED | OUTPATIENT
Start: 2023-10-27 | End: 2023-10-28 | Stop reason: HOSPADM

## 2023-10-27 RX ORDER — MORPHINE SULFATE 4 MG/ML
4 INJECTION, SOLUTION INTRAMUSCULAR; INTRAVENOUS EVERY 6 HOURS PRN
Status: DISCONTINUED | OUTPATIENT
Start: 2023-10-27 | End: 2023-10-28 | Stop reason: HOSPADM

## 2023-10-27 RX ORDER — ERGOCALCIFEROL 1.25 MG/1
50000 CAPSULE ORAL
COMMUNITY
Start: 2023-09-23

## 2023-10-27 RX ORDER — LANOLIN ALCOHOL/MO/W.PET/CERES
400 CREAM (GRAM) TOPICAL ONCE
Status: COMPLETED | OUTPATIENT
Start: 2023-10-27 | End: 2023-10-27

## 2023-10-27 RX ADMIN — HEPARIN SODIUM 5000 UNITS: 5000 INJECTION INTRAVENOUS; SUBCUTANEOUS at 02:10

## 2023-10-27 RX ADMIN — Medication 10 ML: at 06:10

## 2023-10-27 RX ADMIN — SODIUM CHLORIDE, POTASSIUM CHLORIDE, SODIUM LACTATE AND CALCIUM CHLORIDE: 600; 310; 30; 20 INJECTION, SOLUTION INTRAVENOUS at 05:10

## 2023-10-27 RX ADMIN — HYDROXYCHLOROQUINE SULFATE 200 MG: 200 TABLET, FILM COATED ORAL at 09:10

## 2023-10-27 RX ADMIN — ACETAMINOPHEN 650 MG: 325 TABLET, FILM COATED ORAL at 02:10

## 2023-10-27 RX ADMIN — HYDROCODONE BITARTRATE AND ACETAMINOPHEN 1 TABLET: 5; 325 TABLET ORAL at 11:10

## 2023-10-27 RX ADMIN — HYDROXYCHLOROQUINE SULFATE 200 MG: 200 TABLET, FILM COATED ORAL at 08:10

## 2023-10-27 RX ADMIN — Medication 10 ML: at 09:10

## 2023-10-27 RX ADMIN — HEPARIN SODIUM 5000 UNITS: 5000 INJECTION INTRAVENOUS; SUBCUTANEOUS at 05:10

## 2023-10-27 RX ADMIN — HYDROCODONE BITARTRATE AND ACETAMINOPHEN 1 TABLET: 5; 325 TABLET ORAL at 05:10

## 2023-10-27 RX ADMIN — SODIUM CHLORIDE, POTASSIUM CHLORIDE, SODIUM LACTATE AND CALCIUM CHLORIDE: 600; 310; 30; 20 INJECTION, SOLUTION INTRAVENOUS at 02:10

## 2023-10-27 RX ADMIN — HEPARIN SODIUM 5000 UNITS: 5000 INJECTION INTRAVENOUS; SUBCUTANEOUS at 09:10

## 2023-10-27 RX ADMIN — Medication 10 ML: at 02:10

## 2023-10-27 RX ADMIN — Medication 400 MG: at 08:10

## 2023-10-27 RX ADMIN — SODIUM CHLORIDE, POTASSIUM CHLORIDE, SODIUM LACTATE AND CALCIUM CHLORIDE: 600; 310; 30; 20 INJECTION, SOLUTION INTRAVENOUS at 10:10

## 2023-10-27 RX ADMIN — POTASSIUM CHLORIDE 40 MEQ: 1500 TABLET, EXTENDED RELEASE ORAL at 08:10

## 2023-10-27 NOTE — H&P
[facility] Emergency Dept (Observation)  [facility] Medicine  History & Physical    Patient Name: Lyubov Morgan  MRN: [id number]  Patient Class: OP- Observation  Admission Date: 10/26/2023  Attending Physician: Norman Lucio MD   Primary Care Provider: [facility] -          Patient information was obtained from patient, past medical records and ER records.     Subjective:     Principal Problem:ELIDIA (acute kidney injury)    Chief Complaint:   Chief Complaint   Patient presents with    Generalized Body Aches     Body aches and pam leg and hand pain since fusion to right forearm Friday at [facility].         HPI: Ms. Lyubov Morgan is a 52 y.o. female, with PMH of SLE, TENNILLE, who presented to [facility] ED on 10/26/23 due to generalized body aches as well as bilateral hand/leg pain for the past 3 days. She notes associated fatigue, malaise. She has been treating her symptoms with tylenol and ibuprofen. She states the symptoms started after her Reclast infusion for osteoporosis last Friday 10/20 at [facility]. She denied fever/chills, cough, congestion, abdominal pain, nausea, vomiting, diarrhea. While in the ED she was noted to have a fever of 101.9F. She had negative testing for Covid, Flu, and UTI. A CXR showed pulmonary edema vs. Shallow inspiration without consolidation. A procalcitonin was minimally about ULN at 0.27. Metabolic panel showed elevated creatinine of 2.2 initially, that improved to 1.6 after administration of 2L LR in the ED. She was placed on observation.       Past Medical History:   Diagnosis Date    Lupus        Past Surgical History:   Procedure Laterality Date    HYSTERECTOMY         Review of patient's allergies indicates:  No Known Allergies    No current facility-administered medications on file prior to encounter.     Current Outpatient Medications on File Prior to Encounter   Medication Sig    cyanocobalamin, vitamin B-12, 50 mcg tablet Take 50 mcg by mouth once daily.    ferrous sulfate  325 (65 FE) MG EC tablet Take 325 mg by mouth 3 (three) times daily with meals.    hydroxychloroquine (PLAQUENIL) 200 mg tablet Take 400 mg by mouth once daily.    vitamin D 1000 units Tab Take 185 mg by mouth once daily.     Family History    None       Tobacco Use    Smoking status: Never    Smokeless tobacco: Never   Substance and Sexual Activity    Alcohol use: No    Drug use: No    Sexual activity: Not on file     Review of Systems   Constitutional:  Positive for chills, diaphoresis, fatigue and fever. Negative for activity change and appetite change.   HENT:  Negative for congestion, ear pain, postnasal drip, rhinorrhea, sinus pressure, sore throat and trouble swallowing.    Respiratory:  Negative for cough, shortness of breath and wheezing.    Cardiovascular:  Negative for chest pain and palpitations.   Gastrointestinal:  Negative for abdominal distention, abdominal pain, constipation, diarrhea, nausea and vomiting.   Genitourinary:  Negative for dysuria, flank pain, frequency, hematuria, urgency, vaginal discharge and vaginal pain.   Musculoskeletal:  Positive for arthralgias, joint swelling and myalgias. Negative for back pain, gait problem, neck pain and neck stiffness.   Skin:  Negative for color change, pallor and rash.   Neurological:  Negative for dizziness, syncope, weakness, light-headedness, numbness and headaches.   Psychiatric/Behavioral:  Negative for agitation, behavioral problems, confusion and decreased concentration.      Objective:     Vital Signs (Most Recent):  Temp: 98.4 °F (36.9 °C) (10/27/23 0014)  Pulse: 105 (10/27/23 0200)  Resp: 19 (10/27/23 0014)  BP: (!) 96/57 (10/27/23 0014)  SpO2: 99 % (10/27/23 0014) Vital Signs (24h Range):  Temp:  [97.8 °F (36.6 °C)-101.9 °F (38.8 °C)] 98.4 °F (36.9 °C)  Pulse:  [] 105  Resp:  [18-20] 19  SpO2:  [95 %-100 %] 99 %  BP: ()/(57-73) 96/57     Weight: 78 kg (172 lb)  Body mass index is 29.52 kg/m².     Physical Exam  Vitals and  nursing note reviewed.   Constitutional:       General: She is not in acute distress.     Appearance: Normal appearance. She is well-developed and normal weight. She is not ill-appearing, toxic-appearing or diaphoretic.   HENT:      Head: Normocephalic and atraumatic.   Eyes:      General: No scleral icterus.        Right eye: No discharge.         Left eye: No discharge.      Conjunctiva/sclera: Conjunctivae normal.   Neck:      Trachea: No tracheal deviation.   Cardiovascular:      Rate and Rhythm: Normal rate and regular rhythm.      Heart sounds: Normal heart sounds. No murmur heard.     No gallop.   Pulmonary:      Effort: Pulmonary effort is normal. No respiratory distress.      Breath sounds: Normal breath sounds. No stridor. No wheezing or rales.   Abdominal:      General: Bowel sounds are normal. There is no distension.      Palpations: Abdomen is soft. There is no mass.      Tenderness: There is no abdominal tenderness. There is no guarding.   Musculoskeletal:         General: Swelling (bilateral carpometacarpal joints with most swelling on dorsal side of hand) and tenderness (b/l hands) present. No deformity or signs of injury. Normal range of motion.      Cervical back: Normal range of motion and neck supple.      Right lower leg: No edema.      Left lower leg: No edema.   Skin:     General: Skin is warm and dry.      Coloration: Skin is not pale.      Findings: No bruising, erythema or rash.   Neurological:      General: No focal deficit present.      Mental Status: She is alert and oriented to person, place, and time.      Cranial Nerves: No cranial nerve deficit.      Motor: No abnormal muscle tone.   Psychiatric:         Mood and Affect: Mood normal.         Behavior: Behavior normal.         Thought Content: Thought content normal.         Judgment: Judgment normal.                Significant Labs: All pertinent labs within the past 24 hours have been reviewed.  BMP:   Recent Labs   Lab  "10/26/23  1938   *      K 3.8   *   CO2 17*   BUN 19   CREATININE 1.6*   CALCIUM 7.9*     CBC:   Recent Labs   Lab 10/26/23  1157   WBC 10.69   HGB 11.8*   HCT 38.0        CMP:   Recent Labs   Lab 10/26/23  1157 10/26/23  1938    140   K 4.1 3.8    112*   CO2 17* 17*   * 133*   BUN 22* 19   CREATININE 2.2* 1.6*   CALCIUM 8.1* 7.9*   PROT 8.3 7.4   ALBUMIN 3.4* 3.0*   BILITOT 0.4 0.3   ALKPHOS 74 67   AST 19 18   ALT 13 11   ANIONGAP 13 11     Urine Culture: No results for input(s): "LABURIN" in the last 48 hours.  Urine Studies:   Recent Labs   Lab 10/26/23  2021   COLORU Yellow   APPEARANCEUA Clear   PHUR 7.0   SPECGRAV 1.010   PROTEINUA Trace*   GLUCUA Negative   KETONESU Negative   BILIRUBINUA Negative   OCCULTUA Trace*   NITRITE Negative   UROBILINOGEN Negative   LEUKOCYTESUR Negative       Significant Imaging: I have reviewed all pertinent imaging results/findings within the past 24 hours.  Imaging Results              X-Ray Chest 1 View (Final result)  Result time 10/26/23 18:31:53      Final result by Matt Morris MD (10/26/23 18:31:53)                   Impression:      1. Pulmonary findings may reflect edema noting shallow inspiratory effort.  No large focal consolidation.      Electronically signed by: Matt Morris MD  Date:    10/26/2023  Time:    18:31               Narrative:    EXAMINATION:  XR CHEST 1 VIEW    CLINICAL HISTORY:  Fever, unspecified    TECHNIQUE:  Single frontal view of the chest was performed.    COMPARISON:  None    FINDINGS:  The cardiomediastinal silhouette is not enlarged, magnified by technique..  There is no pleural effusion.  The trachea is midline.  The lungs are symmetrically expanded bilaterally coarse interstitial attenuation.  There is bilateral basilar subsegmental atelectasis or scarring..  No large focal consolidation seen.  There is no pneumothorax.  The osseous structures are remarkable for degenerative change..   "                                     Assessment/Plan:     * ELIDIA (acute kidney injury)  - Ms. Lyubov Morgan presents with generalized body aches, and fever 6 days after Reclast infusion   - she has h/o Lupus Nephritis, and recently restarted Plaquenil   - baseline Cr appears to be about 0.9  - today Cr was 2.2 on initially labs and 1.6 after 2L LR in ED   - continue LR infusion overnight   - case discussed between ED NP and [facility], consult order placed   - avoid nephrotoxins, renally dose meds     Fever  - no reported fever at home   - fever in ED of 101.9 F  - infectious workup negative   - suspect 2/2 reaction to Reclast infusion    Myalgia  - suspect 2/2 acute phase reaction to Reclast infusion   - heat/ice as needed       Iron deficiency anemia  - H&H at baseline   - monitor with IVF administration     Osteoporosis  - s/p Reclast infusion on 10/20     Systemic lupus erythematosus  - reports she recently restarted Plaquenil after PFTs showed early ILD       VTE Risk Mitigation (From admission, onward)         Ordered     heparin (porcine) injection 5,000 Units  Every 8 hours         10/26/23 2307     IP VTE HIGH RISK PATIENT  Once         10/26/23 2307     Place KOREY hose  Until discontinued         10/26/23 2106                     JOSÉ MANUEL [facility]  Department of Hospital Medicine  [facility] - Emergency Dept (Observation)

## 2023-10-27 NOTE — SUBJECTIVE & OBJECTIVE
Past Medical History:   Diagnosis Date    Lupus        Past Surgical History:   Procedure Laterality Date    HYSTERECTOMY         Review of patient's allergies indicates:  No Known Allergies    No current facility-administered medications on file prior to encounter.     Current Outpatient Medications on File Prior to Encounter   Medication Sig    cyanocobalamin, vitamin B-12, 50 mcg tablet Take 50 mcg by mouth once daily.    ferrous sulfate 325 (65 FE) MG EC tablet Take 325 mg by mouth 3 (three) times daily with meals.    hydroxychloroquine (PLAQUENIL) 200 mg tablet Take 400 mg by mouth once daily.    vitamin D 1000 units Tab Take 185 mg by mouth once daily.     Family History    None       Tobacco Use    Smoking status: Never    Smokeless tobacco: Never   Substance and Sexual Activity    Alcohol use: No    Drug use: No    Sexual activity: Not on file     Review of Systems   Constitutional:  Positive for chills, diaphoresis, fatigue and fever. Negative for activity change and appetite change.   HENT:  Negative for congestion, ear pain, postnasal drip, rhinorrhea, sinus pressure, sore throat and trouble swallowing.    Respiratory:  Negative for cough, shortness of breath and wheezing.    Cardiovascular:  Negative for chest pain and palpitations.   Gastrointestinal:  Negative for abdominal distention, abdominal pain, constipation, diarrhea, nausea and vomiting.   Genitourinary:  Negative for dysuria, flank pain, frequency, hematuria, urgency, vaginal discharge and vaginal pain.   Musculoskeletal:  Positive for arthralgias, joint swelling and myalgias. Negative for back pain, gait problem, neck pain and neck stiffness.   Skin:  Negative for color change, pallor and rash.   Neurological:  Negative for dizziness, syncope, weakness, light-headedness, numbness and headaches.   Psychiatric/Behavioral:  Negative for agitation, behavioral problems, confusion and decreased concentration.      Objective:     Vital Signs (Most  Recent):  Temp: 98.4 °F (36.9 °C) (10/27/23 0014)  Pulse: 105 (10/27/23 0200)  Resp: 19 (10/27/23 0014)  BP: (!) 96/57 (10/27/23 0014)  SpO2: 99 % (10/27/23 0014) Vital Signs (24h Range):  Temp:  [97.8 °F (36.6 °C)-101.9 °F (38.8 °C)] 98.4 °F (36.9 °C)  Pulse:  [] 105  Resp:  [18-20] 19  SpO2:  [95 %-100 %] 99 %  BP: ()/(57-73) 96/57     Weight: 78 kg (172 lb)  Body mass index is 29.52 kg/m².     Physical Exam  Vitals and nursing note reviewed.   Constitutional:       General: She is not in acute distress.     Appearance: Normal appearance. She is well-developed and normal weight. She is not ill-appearing, toxic-appearing or diaphoretic.   HENT:      Head: Normocephalic and atraumatic.   Eyes:      General: No scleral icterus.        Right eye: No discharge.         Left eye: No discharge.      Conjunctiva/sclera: Conjunctivae normal.   Neck:      Trachea: No tracheal deviation.   Cardiovascular:      Rate and Rhythm: Normal rate and regular rhythm.      Heart sounds: Normal heart sounds. No murmur heard.     No gallop.   Pulmonary:      Effort: Pulmonary effort is normal. No respiratory distress.      Breath sounds: Normal breath sounds. No stridor. No wheezing or rales.   Abdominal:      General: Bowel sounds are normal. There is no distension.      Palpations: Abdomen is soft. There is no mass.      Tenderness: There is no abdominal tenderness. There is no guarding.   Musculoskeletal:         General: Swelling (bilateral carpometacarpal joints with most swelling on dorsal side of hand) and tenderness (b/l hands) present. No deformity or signs of injury. Normal range of motion.      Cervical back: Normal range of motion and neck supple.      Right lower leg: No edema.      Left lower leg: No edema.   Skin:     General: Skin is warm and dry.      Coloration: Skin is not pale.      Findings: No bruising, erythema or rash.   Neurological:      General: No focal deficit present.      Mental Status: She is  "alert and oriented to person, place, and time.      Cranial Nerves: No cranial nerve deficit.      Motor: No abnormal muscle tone.   Psychiatric:         Mood and Affect: Mood normal.         Behavior: Behavior normal.         Thought Content: Thought content normal.         Judgment: Judgment normal.                Significant Labs: All pertinent labs within the past 24 hours have been reviewed.  BMP:   Recent Labs   Lab 10/26/23  1938   *      K 3.8   *   CO2 17*   BUN 19   CREATININE 1.6*   CALCIUM 7.9*     CBC:   Recent Labs   Lab 10/26/23  1157   WBC 10.69   HGB 11.8*   HCT 38.0        CMP:   Recent Labs   Lab 10/26/23  1157 10/26/23  1938    140   K 4.1 3.8    112*   CO2 17* 17*   * 133*   BUN 22* 19   CREATININE 2.2* 1.6*   CALCIUM 8.1* 7.9*   PROT 8.3 7.4   ALBUMIN 3.4* 3.0*   BILITOT 0.4 0.3   ALKPHOS 74 67   AST 19 18   ALT 13 11   ANIONGAP 13 11     Urine Culture: No results for input(s): "LABURIN" in the last 48 hours.  Urine Studies:   Recent Labs   Lab 10/26/23  2021   COLORU Yellow   APPEARANCEUA Clear   PHUR 7.0   SPECGRAV 1.010   PROTEINUA Trace*   GLUCUA Negative   KETONESU Negative   BILIRUBINUA Negative   OCCULTUA Trace*   NITRITE Negative   UROBILINOGEN Negative   LEUKOCYTESUR Negative       Significant Imaging: I have reviewed all pertinent imaging results/findings within the past 24 hours.  Imaging Results              X-Ray Chest 1 View (Final result)  Result time 10/26/23 18:31:53      Final result by Matt Morris MD (10/26/23 18:31:53)                   Impression:      1. Pulmonary findings may reflect edema noting shallow inspiratory effort.  No large focal consolidation.      Electronically signed by: Matt Morris MD  Date:    10/26/2023  Time:    18:31               Narrative:    EXAMINATION:  XR CHEST 1 VIEW    CLINICAL HISTORY:  Fever, unspecified    TECHNIQUE:  Single frontal view of the chest was " performed.    COMPARISON:  None    FINDINGS:  The cardiomediastinal silhouette is not enlarged, magnified by technique..  There is no pleural effusion.  The trachea is midline.  The lungs are symmetrically expanded bilaterally coarse interstitial attenuation.  There is bilateral basilar subsegmental atelectasis or scarring..  No large focal consolidation seen.  There is no pneumothorax.  The osseous structures are remarkable for degenerative change..

## 2023-10-27 NOTE — PLAN OF CARE
Independent -  will provide transportation home    [facility] - Emergency Dept (Observation)  Initial Discharge Assessment       Primary Care Provider: Yojana Lainez MD    Admission Diagnosis: ELIDIA (acute kidney injury) [N17.9]    Admission Date: 10/26/2023  Expected Discharge Date:     Transition of Care Barriers: None    Payor: BLUE CROSS [employer]UE [employer] / Plan: BLUE CONNECT / Product Type: HMO /     Extended Emergency Contact Information  Primary Emergency Contact: Elma Ledbetter   [facility]  Home Phone: [phone]  Relation: Mother    Discharge Plan A: Home with family         [facility] [address] [place] [facility] [address]  [address]  [place] [address]  Phone: [phone] Fax: [phone]      Initial Assessment (most recent)       Adult Discharge Assessment - 10/27/23 0806          Discharge Assessment    Assessment Type Discharge Planning Assessment     Confirmed/corrected address, phone number and insurance Yes     Confirmed Demographics --   corrected in EPIC    Source of Information patient     Does patient/caregiver understand observation status Yes     People in Home spouse     Do you expect to return to your current living situation? Yes     Do you have help at home or someone to help you manage your care at home? Yes     Prior to hospitilization cognitive status: Alert/Oriented     Current cognitive status: Alert/Oriented     Equipment Currently Used at Home none     Readmission within 30 days? No     Patient currently being followed by outpatient case management? No     Do you currently have service(s) that help you manage your care at home? No     Do you take prescription medications? Yes     Do you have prescription coverage? Yes     Do you have any problems affording any of your prescribed medications? No     Is the patient taking medications as prescribed? yes     Who is going to help you get home at discharge?      How do you get to doctors  appointments? car, drives self     Are you on dialysis? No     DME Needed Upon Discharge  none     Discharge Plan discussed with: Patient     Transition of Care Barriers None     Discharge Plan A Home with family        Physical Activity    On average, how many days per week do you engage in moderate to strenuous exercise (like a brisk walk)? 2 days     On average, how many minutes do you engage in exercise at this level? 20 min        Financial Resource Strain    How hard is it for you to pay for the very basics like food, housing, medical care, and heating? Not hard at all        Housing Stability    In the last 12 months, was there a time when you were not able to pay the mortgage or rent on time? No     In the last 12 months, how many places have you lived? 1     In the last 12 months, was there a time when you did not have a steady place to sleep or slept in a shelter (including now)? No        Transportation Needs    In the past 12 months, has lack of transportation kept you from medical appointments or from getting medications? No     In the past 12 months, has lack of transportation kept you from meetings, work, or from getting things needed for daily living? No        Food Insecurity    Within the past 12 months, you worried that your food would run out before you got the money to buy more. Never true     Within the past 12 months, the food you bought just didn't last and you didn't have money to get more. Never true        Stress    Do you feel stress - tense, restless, nervous, or anxious, or unable to sleep at night because your mind is troubled all the time - these days? Only a little        Social Connections    In a typical week, how many times do you talk on the phone with family, friends, or neighbors? More than three times a week     How often do you get together with friends or relatives? Once a week     How often do you attend Sikhism or Yazdanism services? More than 4 times per year     Do you  belong to any clubs or organizations such as Confucianism groups, unions, fraternal or athletic groups, or school groups? No     How often do you attend meetings of the clubs or organizations you belong to? Never     Are you , , , , never , or living with a partner?         Alcohol Use    Q1: How often do you have a drink containing alcohol? Never     Q2: How many drinks containing alcohol do you have on a typical day when you are drinking? Patient does not drink     Q3: How often do you have six or more drinks on one occasion? Never

## 2023-10-27 NOTE — ED NOTES
Pt resting on stretcher. AAO x 3. HOB slightly elevated. RR even and unlabored. Pt states the flare-ups occur in hand and she just keeps them covered. Restroom and comfort needs addressed. NADN. Pt updated on POC.  Call light within reach. Side rails up x 2. Breakfast ordered. Will continue to monitor.

## 2023-10-27 NOTE — ASSESSMENT & PLAN NOTE
- no reported fever at home   - fever in ED of 101.9 F  - infectious workup negative   - suspect 2/2 reaction to Reclast infusion

## 2023-10-27 NOTE — HPI
Ms. Lyubov Morgan is a 52 y.o. female, with PMH of SLE, TENNILLE, who presented to [facility] ED on 10/26/23 due to generalized body aches as well as bilateral hand/leg pain for the past 3 days. She notes associated fatigue, malaise. She has been treating her symptoms with tylenol and ibuprofen. She states the symptoms started after her Reclast infusion for osteoporosis last Friday 10/20 at [facility]. She denied fever/chills, cough, congestion, abdominal pain, nausea, vomiting, diarrhea. While in the ED she was noted to have a fever of 101.9F. She had negative testing for Covid, Flu, and UTI. A CXR showed pulmonary edema vs. Shallow inspiration without consolidation. A procalcitonin was minimally about ULN at 0.27. Metabolic panel showed elevated creatinine of 2.2 initially, that improved to 1.6 after administration of 2L LR in the ED. She was placed on observation.

## 2023-10-27 NOTE — ASSESSMENT & PLAN NOTE
- Ms. Lyubov Morgan presents with generalized body aches, and fever 6 days after Reclast infusion   - she has h/o Lupus Nephritis, and recently restarted Plaquenil   - baseline Cr appears to be about 0.9  - today Cr was 2.2 on initially labs and 1.6 after 2L LR in ED   - continue LR infusion overnight   - case discussed between ED NP and [facility], consult order placed   - avoid nephrotoxins, renally dose meds

## 2023-10-28 VITALS
BODY MASS INDEX: 29.37 KG/M2 | HEART RATE: 105 BPM | DIASTOLIC BLOOD PRESSURE: 86 MMHG | RESPIRATION RATE: 18 BRPM | OXYGEN SATURATION: 96 % | HEIGHT: 64 IN | TEMPERATURE: 98 F | WEIGHT: 172 LBS | SYSTOLIC BLOOD PRESSURE: 132 MMHG

## 2023-10-28 LAB
ANION GAP SERPL CALC-SCNC: 8 MMOL/L (ref 8–16)
BASOPHILS # BLD AUTO: 0.02 K/UL (ref 0–0.2)
BASOPHILS NFR BLD: 0.3 % (ref 0–1.9)
BUN SERPL-MCNC: 15 MG/DL (ref 6–20)
CALCIUM SERPL-MCNC: 8.2 MG/DL (ref 8.7–10.5)
CHLORIDE SERPL-SCNC: 115 MMOL/L (ref 95–110)
CO2 SERPL-SCNC: 19 MMOL/L (ref 23–29)
CREAT SERPL-MCNC: 1 MG/DL (ref 0.5–1.4)
DIFFERENTIAL METHOD: ABNORMAL
EOSINOPHIL # BLD AUTO: 0.2 K/UL (ref 0–0.5)
EOSINOPHIL NFR BLD: 3.2 % (ref 0–8)
ERYTHROCYTE [DISTWIDTH] IN BLOOD BY AUTOMATED COUNT: 13.5 % (ref 11.5–14.5)
EST. GFR  (NO RACE VARIABLE): >60 ML/MIN/1.73 M^2
GLUCOSE SERPL-MCNC: 99 MG/DL (ref 70–110)
HCT VFR BLD AUTO: 30.6 % (ref 37–48.5)
HGB BLD-MCNC: 9.6 G/DL (ref 12–16)
IMM GRANULOCYTES # BLD AUTO: 0.02 K/UL (ref 0–0.04)
IMM GRANULOCYTES NFR BLD AUTO: 0.3 % (ref 0–0.5)
LYMPHOCYTES # BLD AUTO: 1.5 K/UL (ref 1–4.8)
LYMPHOCYTES NFR BLD: 25.3 % (ref 18–48)
MAGNESIUM SERPL-MCNC: 1.8 MG/DL (ref 1.6–2.6)
MCH RBC QN AUTO: 25.9 PG (ref 27–31)
MCHC RBC AUTO-ENTMCNC: 31.4 G/DL (ref 32–36)
MCV RBC AUTO: 83 FL (ref 82–98)
MONOCYTES # BLD AUTO: 0.2 K/UL (ref 0.3–1)
MONOCYTES NFR BLD: 3 % (ref 4–15)
NEUTROPHILS # BLD AUTO: 4.1 K/UL (ref 1.8–7.7)
NEUTROPHILS NFR BLD: 67.9 % (ref 38–73)
NRBC BLD-RTO: 0 /100 WBC
PLATELET # BLD AUTO: 167 K/UL (ref 150–450)
PLATELET BLD QL SMEAR: ABNORMAL
PMV BLD AUTO: 9.4 FL (ref 9.2–12.9)
POTASSIUM SERPL-SCNC: 3.8 MMOL/L (ref 3.5–5.1)
RBC # BLD AUTO: 3.71 M/UL (ref 4–5.4)
SODIUM SERPL-SCNC: 142 MMOL/L (ref 136–145)
WBC # BLD AUTO: 5.97 K/UL (ref 3.9–12.7)

## 2023-10-28 PROCEDURE — 83735 ASSAY OF MAGNESIUM: CPT | Performed by: PHYSICIAN ASSISTANT

## 2023-10-28 PROCEDURE — 25000003 PHARM REV CODE 250: Performed by: PHYSICIAN ASSISTANT

## 2023-10-28 PROCEDURE — 63600175 PHARM REV CODE 636 W HCPCS: Performed by: PHYSICIAN ASSISTANT

## 2023-10-28 PROCEDURE — 96372 THER/PROPH/DIAG INJ SC/IM: CPT | Performed by: PHYSICIAN ASSISTANT

## 2023-10-28 PROCEDURE — G0378 HOSPITAL OBSERVATION PER HR: HCPCS

## 2023-10-28 PROCEDURE — 85025 COMPLETE CBC W/AUTO DIFF WBC: CPT | Performed by: PHYSICIAN ASSISTANT

## 2023-10-28 PROCEDURE — 36415 COLL VENOUS BLD VENIPUNCTURE: CPT | Performed by: PHYSICIAN ASSISTANT

## 2023-10-28 PROCEDURE — 80048 BASIC METABOLIC PNL TOTAL CA: CPT | Performed by: PHYSICIAN ASSISTANT

## 2023-10-28 PROCEDURE — 96361 HYDRATE IV INFUSION ADD-ON: CPT

## 2023-10-28 PROCEDURE — A4216 STERILE WATER/SALINE, 10 ML: HCPCS | Performed by: PHYSICIAN ASSISTANT

## 2023-10-28 RX ADMIN — HYDROXYCHLOROQUINE SULFATE 200 MG: 200 TABLET, FILM COATED ORAL at 09:10

## 2023-10-28 RX ADMIN — HEPARIN SODIUM 5000 UNITS: 5000 INJECTION INTRAVENOUS; SUBCUTANEOUS at 06:10

## 2023-10-28 RX ADMIN — Medication 10 ML: at 06:10

## 2023-10-28 RX ADMIN — SODIUM CHLORIDE, POTASSIUM CHLORIDE, SODIUM LACTATE AND CALCIUM CHLORIDE: 600; 310; 30; 20 INJECTION, SOLUTION INTRAVENOUS at 06:10

## 2023-10-28 NOTE — ED NOTES
AM rounding: pt lying in bed talking to spouse, HOB low henry's, awake, alert, oriented x 3. RR even/unlabored, pt present with pain 2/10, discomfort level 3/10, refuse any pain medication at present, NADN, pt on continue cardiac monitor, will continue to monitor.

## 2023-10-28 NOTE — HOSPITAL COURSE
Patient presented with Fevers and Body Aches/Hand and Leg Pain after receiving her first dose of Reclast infusion for Osteoporosis on 10/20/2023.  In ED, patient had a fever for 101.9 with negative Flu and COVID screening.  CXR showed no focal consolidations.  Labs on admission with ELIDIA with creatinine of 2.2, WBC 10.69 with no left shift, Procal 0.27, normal C3/C4.  Patient was started on IV fluids with improvement in creatinine down to 1.0.  Blood cultures drawn with NGTD.  She is feeling better with resolution of fever and body aches.  Reclast added to her allergy profile.  Discharge home.

## 2023-10-28 NOTE — DISCHARGE SUMMARY
[facility] Emergency Dept (Observation)  [facility] Medicine  Discharge Summary      Patient Name: Lyubov Morgan  MRN: [id number]  MARIELA: [id number]  Patient Class: OP- Observation  Admission Date: 10/26/2023  Hospital Length of Stay: 0 days  Discharge Date and Time:  10/28/2023 11:56 AM  Attending Physician: Norman Lucio MD   Discharging Provider: Norman Lucio MD  Primary Care Provider: Yojana Lainez MD    Primary Care Team: Networked reference to record PCT     HPI:   Ms. Lyubov Morgan is a 52 y.o. female, with PMH of SLE, TENNILLE, who presented to [facility] ED on 10/26/23 due to generalized body aches as well as bilateral hand/leg pain for the past 3 days. She notes associated fatigue, malaise. She has been treating her symptoms with tylenol and ibuprofen. She states the symptoms started after her Reclast infusion for osteoporosis last Friday 10/20 at [facility]. She denied fever/chills, cough, congestion, abdominal pain, nausea, vomiting, diarrhea. While in the ED she was noted to have a fever of 101.9F. She had negative testing for Covid, Flu, and UTI. A CXR showed pulmonary edema vs. Shallow inspiration without consolidation. A procalcitonin was minimally about ULN at 0.27. Metabolic panel showed elevated creatinine of 2.2 initially, that improved to 1.6 after administration of 2L LR in the ED. She was placed on observation.       * No surgery found *      Hospital Course:   Patient presented with Fevers and Body Aches/Hand and Leg Pain after receiving her first dose of Reclast infusion for Osteoporosis on 10/20/2023.  In ED, patient had a fever for 101.9 with negative Flu and COVID screening.  CXR showed no focal consolidations.  Labs on admission with ELIDIA with creatinine of 2.2, WBC 10.69 with no left shift, Procal 0.27, normal C3/C4.  Patient was started on IV fluids with improvement in creatinine down to 1.0.  Blood cultures drawn with NGTD.  She is feeling better with resolution of fever and body aches.   Reclast added to her allergy profile.  Discharge home.       Goals of Care Treatment Preferences:  Code Status: Full Code      Consults:   Consults (From admission, onward)        Status Ordering Provider     Inpatient consult to [facility]  Once        Provider:  Frank Sharma MD    Acknowledged BURT FRAUSTO          No new Assessment & Plan notes have been filed under this hospital service since the last note was generated.  Service: Hospital Medicine    Final Active Diagnoses:    Diagnosis Date Noted POA    PRINCIPAL PROBLEM:  ELIDIA (acute kidney injury) [N17.9] 10/26/2023 Yes    Osteoporosis [M81.0] 10/27/2023 Yes    Myalgia [M79.10] 10/27/2023 Yes    Fever [R50.9] 10/27/2023 Yes    Systemic lupus erythematosus [M32.9] 05/07/2013 Yes    Iron deficiency anemia [D50.9] 11/07/2012 Yes      Problems Resolved During this Admission:       Discharged Condition: fair    Disposition: Home or Self Care    Follow Up:   Follow-up Information     Yojana Lainez MD. Schedule an appointment as soon as possible for a visit in 1 week(s).    Specialty: Pediatrics  Contact information:  [address] [facility] [address] [phone]                       Patient Instructions:      Diet Adult Regular     Notify your health care provider if you experience any of the following:  temperature >100.4     Notify your health care provider if you experience any of the following:  persistent nausea and vomiting or diarrhea     Notify your health care provider if you experience any of the following:  increased confusion or weakness     No dressing needed     Activity as tolerated       Significant Diagnostic Studies: Labs:   BMP:   Recent Labs   Lab 10/26/23  1938 10/27/23  0546 10/28/23  0536   * 121* 99    140 142   K 3.8 3.6 3.8   * 114* 115*   CO2 17* 19* 19*   BUN 19 16 15   CREATININE 1.6* 1.1 1.0   CALCIUM 7.9* 7.9* 8.2*   MG  --  1.7 1.8    and CBC   Recent Labs   Lab 10/26/23  115  10/27/23  0546 10/28/23  0536   WBC 10.69 8.34 5.97   HGB 11.8* 10.4* 9.6*   HCT 38.0 32.0* 30.6*    160 167       Pending Diagnostic Studies:     None         Medications:  Reconciled Home Medications:      Medication List      CONTINUE taking these medications    cyanocobalamin (vitamin B-12) 50 mcg tablet  Take 50 mcg by mouth once daily.     ergocalciferol 50,000 unit Cap  Commonly known as: ERGOCALCIFEROL  Take 50,000 Units by mouth every 7 days.     ferrous sulfate 325 (65 FE) MG EC tablet  Take 325 mg by mouth 3 (three) times daily with meals.     hydroxychloroquine 200 mg tablet  Commonly known as: PLAQUENIL  Take 400 mg by mouth once daily.     vitamin D 1000 units Tab  Commonly known as: VITAMIN D3  Take 185 mg by mouth once daily.        STOP taking these medications    ibuprofen 800 MG tablet  Commonly known as: ADVIL,MOTRIN            Indwelling Lines/Drains at time of discharge:   Lines/Drains/Airways     None                 Time spent on the discharge of patient: 35 minutes         Norman Lucio MD  Department of Hospital Medicine  [facility] - Emergency Dept (Observation)

## 2023-10-28 NOTE — DISCHARGE INSTRUCTIONS
For your admission for acute kidney injury and fever, there was no indication of infection and your fever resolved.  Your kidney injury has resolved after IV fluids.  Would advise against further Reclast treatment given possible adverse reaction.  Please follow up with your Primary Care Provider soon.

## 2023-10-28 NOTE — ED NOTES
Pt stated her pain was at a 7, but requesting the hydrocodone acetaminophen medication as it provided relief earlier in the day.

## 2023-10-28 NOTE — PLAN OF CARE
Spoke with patient regarding discharge - will contact PCP Monday once office reopens to schedule hospital follow up appt -  will provide transportation home - denied any further discharge needs     [facility] - Emergency Dept (Observation)  Discharge Final Note    Primary Care Provider: Yojana Lainez MD    Expected Discharge Date: 10/28/2023    Final Discharge Note (most recent)       Final Note - 10/28/23 1206          Final Note    Assessment Type Final Discharge Note     Anticipated Discharge Disposition Home or Self Care     What phone number can be called within the next 1-3 days to see how you are doing after discharge? [phone]     Hospital Resources/Appts/Education Provided Provided patient/caregiver with written discharge plan information        Post-Acute Status    Discharge Delays None known at this time                   Contact Info       Yojana Lainez MD   Specialty: Pediatrics   Relationship: PCP - General    [address] [place] [id number]   Phone: [phone]       Next Steps: Schedule an appointment as soon as possible for a visit in 1 week(s)

## 2024-01-29 PROBLEM — N17.9 AKI (ACUTE KIDNEY INJURY): Status: RESOLVED | Noted: 2023-10-26 | Resolved: 2024-01-29

## 2024-10-14 ENCOUNTER — PATIENT MESSAGE (OUTPATIENT)
Dept: GASTROENTEROLOGY | Facility: CLINIC | Age: 54
End: 2024-10-14
Payer: COMMERCIAL